# Patient Record
Sex: MALE | Race: WHITE | NOT HISPANIC OR LATINO | URBAN - METROPOLITAN AREA
[De-identification: names, ages, dates, MRNs, and addresses within clinical notes are randomized per-mention and may not be internally consistent; named-entity substitution may affect disease eponyms.]

---

## 2018-03-12 PROBLEM — 429993008 HISTORY OF CEREBROVASCULAR ACCIDENT WITHOUT RESIDUAL DEFICITS: Status: ACTIVE | Noted: 2018-03-12

## 2018-03-12 PROBLEM — 70153002 HEMORRHOIDS: Status: ACTIVE | Noted: 2018-03-12

## 2018-03-12 PROBLEM — 84089009 HIATAL HERNIA: Status: ACTIVE | Noted: 2018-03-12

## 2018-03-12 PROBLEM — 13644009 HYPERCHOLESTEROLEMIA: Status: ACTIVE | Noted: 2018-03-12

## 2018-03-12 PROBLEM — 398050005 DIVERTICULAR DISEASE OF COLON: Status: ACTIVE | Noted: 2018-03-12

## 2018-03-12 PROBLEM — 73430006 SLEEP APNEA: Status: ACTIVE | Noted: 2018-03-12

## 2021-08-28 ENCOUNTER — TELEPHONE ENCOUNTER (OUTPATIENT)
Dept: URBAN - METROPOLITAN AREA CLINIC 13 | Facility: CLINIC | Age: 56
End: 2021-08-28

## 2021-08-29 ENCOUNTER — TELEPHONE ENCOUNTER (OUTPATIENT)
Dept: URBAN - METROPOLITAN AREA CLINIC 13 | Facility: CLINIC | Age: 56
End: 2021-08-29

## 2021-08-29 RX ORDER — ASPIRIN 81 MG/1
TABLET, COATED ORAL
Status: ACTIVE | COMMUNITY

## 2021-08-29 RX ORDER — ROSUVASTATIN CALCIUM 10 MG/1
TABLET, FILM COATED ORAL
Status: ACTIVE | COMMUNITY

## 2021-08-29 RX ORDER — CLOPIDOGREL 75 MG/1
TABLET ORAL
Status: ACTIVE | COMMUNITY

## 2021-08-29 RX ORDER — PANTOPRAZOLE SODIUM 40 MG/1
TABLET, DELAYED RELEASE ORAL
Status: ACTIVE | COMMUNITY

## 2021-08-29 RX ORDER — DEXLANSOPRAZOLE 60 MG/1
CAPSULE, DELAYED RELEASE ORAL
Status: ACTIVE | COMMUNITY
Start: 2018-03-21

## 2021-08-29 RX ORDER — FAMOTIDINE 20 MG/1
TABLET ORAL
Status: ACTIVE | COMMUNITY

## 2024-04-11 ENCOUNTER — OV NP (OUTPATIENT)
Dept: URBAN - METROPOLITAN AREA CLINIC 48 | Facility: CLINIC | Age: 59
End: 2024-04-11

## 2024-04-11 RX ORDER — EMPAGLIFLOZIN AND LINAGLIPTIN 25; 5 MG/1; MG/1
TAKE 1 TABLET BY MOUTH IN THE MORNING TABLET, FILM COATED ORAL
Qty: 90 EACH | Refills: 0 | Status: ACTIVE | COMMUNITY

## 2024-04-11 RX ORDER — APIXABAN 5 MG/1
TAKE 1 TABLET BY MOUTH TWICE DAILY TO PREVENT BLOOD CLOTS IN CHRONIC ATRIAL FIBRILLATION INDICATIONS TABLET, FILM COATED ORAL
Qty: 180 EACH | Refills: 1 | Status: ACTIVE | COMMUNITY

## 2024-04-11 RX ORDER — METFORMIN HYDROCHLORIDE 1000 MG/1
1 TABLET WITH A MEAL TABLET, FILM COATED ORAL ONCE A DAY
Status: ACTIVE | COMMUNITY

## 2024-04-11 RX ORDER — OMEPRAZOLE 40 MG/1
CAPSULE, DELAYED RELEASE ORAL
Qty: 90 CAPSULE | Status: ACTIVE | COMMUNITY

## 2024-04-11 RX ORDER — SEMAGLUTIDE 0.68 MG/ML
INJECTION, SOLUTION SUBCUTANEOUS
Qty: 3 MILLILITER | Status: ACTIVE | COMMUNITY

## 2024-04-11 RX ORDER — ROSUVASTATIN CALCIUM 10 MG/1
1 TABLET TABLET, FILM COATED ORAL ONCE A DAY
Status: ACTIVE | COMMUNITY

## 2024-07-26 ENCOUNTER — DASHBOARD ENCOUNTERS (OUTPATIENT)
Age: 59
End: 2024-07-26

## 2024-07-29 ENCOUNTER — LAB OUTSIDE AN ENCOUNTER (OUTPATIENT)
Dept: URBAN - METROPOLITAN AREA CLINIC 48 | Facility: CLINIC | Age: 59
End: 2024-07-29

## 2024-07-29 ENCOUNTER — OFFICE VISIT (OUTPATIENT)
Dept: URBAN - METROPOLITAN AREA CLINIC 48 | Facility: CLINIC | Age: 59
End: 2024-07-29
Payer: COMMERCIAL

## 2024-07-29 VITALS
HEART RATE: 91 BPM | DIASTOLIC BLOOD PRESSURE: 77 MMHG | BODY MASS INDEX: 28.66 KG/M2 | HEIGHT: 72 IN | OXYGEN SATURATION: 98 % | TEMPERATURE: 98.1 F | SYSTOLIC BLOOD PRESSURE: 130 MMHG | WEIGHT: 211.6 LBS

## 2024-07-29 DIAGNOSIS — Z83.719 FAMILY HX COLONIC POLYPS: ICD-10-CM

## 2024-07-29 DIAGNOSIS — K21.9 GASTROESOPHAGEAL REFLUX DISEASE WITHOUT ESOPHAGITIS: ICD-10-CM

## 2024-07-29 DIAGNOSIS — C81.92 HODGKIN LYMPHOMA OF INTRATHORACIC LYMPH NODES, UNSPECIFIED HODGKIN LYMPHOMA TYPE: ICD-10-CM

## 2024-07-29 PROBLEM — 429969003: Status: ACTIVE | Noted: 2024-07-29

## 2024-07-29 PROBLEM — 266435005: Status: ACTIVE | Noted: 2024-07-29

## 2024-07-29 PROBLEM — 93522004: Status: ACTIVE | Noted: 2024-07-29

## 2024-07-29 PROCEDURE — 99204 OFFICE O/P NEW MOD 45 MIN: CPT | Performed by: NURSE PRACTITIONER

## 2024-07-29 RX ORDER — ROSUVASTATIN CALCIUM 10 MG/1
1 TABLET TABLET, FILM COATED ORAL ONCE A DAY
Status: ACTIVE | COMMUNITY

## 2024-07-29 RX ORDER — OMEPRAZOLE 40 MG/1
1 CAPSULE 30 MINUTES BEFORE MEAL CAPSULE, DELAYED RELEASE ORAL ONCE A DAY
Status: ACTIVE | COMMUNITY

## 2024-07-29 RX ORDER — APIXABAN 5 MG/1
AS DIRECTED TABLET, FILM COATED ORAL
Refills: 1 | Status: ACTIVE | COMMUNITY

## 2024-07-29 RX ORDER — METFORMIN HYDROCHLORIDE 1000 MG/1
1 TABLET WITH A MEAL TABLET, FILM COATED ORAL ONCE A DAY
Status: ACTIVE | COMMUNITY

## 2024-07-29 RX ORDER — SEMAGLUTIDE 0.68 MG/ML
INJECTION, SOLUTION SUBCUTANEOUS
Qty: 3 MILLILITER | Status: ACTIVE | COMMUNITY

## 2024-07-29 RX ORDER — ASPIRIN 81 MG/1
1 TABLET TABLET, COATED ORAL ONCE A DAY
Status: ACTIVE | COMMUNITY

## 2024-07-29 NOTE — HPI-TODAY'S VISIT:
59 year old male with a history of Hodgkin's lymphoma, presents for a colon consult. Bowel movements are daily of formed stool. The patient denies blood in stool, weight changes, or abdominal pain. Last colonoscopy was in 2016 and showed hypeplastic polyps, diverticulosis, and internal hemorrhoids. His mother had colon polyps.   The patient feels GERD is mostly controlled on Omeprazole 40mg daily and supplemental Famotidine. If he skips a dose, he gets GERD. Dexilant has worked the best but insurance does not cover it. EGD in 2018 was normal. Pasquale he is on Ozempic injections 2 months ago for diabetes.  In 2008, he had a uvulectomy and tonsilectmy. He has overproduction of of saliva and he chokes and has had nocturnal episodes. It has occurred for 2 years. At times, he feels that he aspirates on some foods such as rice.  He had stage 3 Hodgkins lymphoma and last treatment was in 2021. History of CVA in 2015 and is on daily Eliquis.

## 2024-10-01 ENCOUNTER — CLAIMS CREATED FROM THE CLAIM WINDOW (OUTPATIENT)
Dept: URBAN - METROPOLITAN AREA SURGERY CENTER 28 | Facility: SURGERY CENTER | Age: 59
End: 2024-10-01
Payer: COMMERCIAL

## 2024-10-01 ENCOUNTER — CLAIMS CREATED FROM THE CLAIM WINDOW (OUTPATIENT)
Dept: URBAN - METROPOLITAN AREA CLINIC 4 | Facility: CLINIC | Age: 59
End: 2024-10-01
Payer: COMMERCIAL

## 2024-10-01 DIAGNOSIS — Z12.11 COLON CANCER SCREENING: ICD-10-CM

## 2024-10-01 DIAGNOSIS — D12.5 ADENOMATOUS POLYP OF SIGMOID COLON: ICD-10-CM

## 2024-10-01 DIAGNOSIS — K63.5 BENIGN COLON POLYP: ICD-10-CM

## 2024-10-01 DIAGNOSIS — D12.4 ADENOMA OF DESCENDING COLON: ICD-10-CM

## 2024-10-01 DIAGNOSIS — D12.5 BENIGN NEOPLASM OF SIGMOID COLON: ICD-10-CM

## 2024-10-01 DIAGNOSIS — K22.2 ACQUIRED ESOPHAGEAL RING: ICD-10-CM

## 2024-10-01 DIAGNOSIS — K31.89 ACHYLIA: ICD-10-CM

## 2024-10-01 DIAGNOSIS — D12.5 ADENOMA OF SIGMOID COLON: ICD-10-CM

## 2024-10-01 DIAGNOSIS — K31.89 FOCAL FOVEOLAR HYPERPLASIA: ICD-10-CM

## 2024-10-01 DIAGNOSIS — K31.89 OTHER DISEASES OF STOMACH AND DUODENUM: ICD-10-CM

## 2024-10-01 DIAGNOSIS — K21.9 ACID REFLUX: ICD-10-CM

## 2024-10-01 DIAGNOSIS — D12.4 BENIGN NEOPLASM OF DESCENDING COLON: ICD-10-CM

## 2024-10-01 DIAGNOSIS — D12.4 ADENOMATOUS POLYP OF DESCENDING COLON: ICD-10-CM

## 2024-10-01 DIAGNOSIS — K63.5 BENIGN COLON POLYPS: ICD-10-CM

## 2024-10-01 DIAGNOSIS — K63.5 POLYP OF COLON: ICD-10-CM

## 2024-10-01 DIAGNOSIS — R13.19 CERVICAL DYSPHAGIA: ICD-10-CM

## 2024-10-01 PROCEDURE — 45380 COLONOSCOPY AND BIOPSY: CPT | Performed by: INTERNAL MEDICINE

## 2024-10-01 PROCEDURE — 88305 TISSUE EXAM BY PATHOLOGIST: CPT | Performed by: PATHOLOGY

## 2024-10-01 PROCEDURE — 43239 EGD BIOPSY SINGLE/MULTIPLE: CPT | Performed by: INTERNAL MEDICINE

## 2024-10-01 PROCEDURE — 45385 COLONOSCOPY W/LESION REMOVAL: CPT | Performed by: INTERNAL MEDICINE

## 2024-10-01 PROCEDURE — 88312 SPECIAL STAINS GROUP 1: CPT | Performed by: PATHOLOGY

## 2024-10-01 PROCEDURE — 43248 EGD GUIDE WIRE INSERTION: CPT | Performed by: INTERNAL MEDICINE

## 2024-10-01 PROCEDURE — 00813 ANES UPR LWR GI NDSC PX: CPT | Performed by: ANESTHESIOLOGY

## 2024-10-01 RX ORDER — ASPIRIN 81 MG/1
1 TABLET TABLET, COATED ORAL ONCE A DAY
Status: ACTIVE | COMMUNITY

## 2024-10-01 RX ORDER — METFORMIN HYDROCHLORIDE 1000 MG/1
1 TABLET WITH A MEAL TABLET, FILM COATED ORAL ONCE A DAY
Status: ACTIVE | COMMUNITY

## 2024-10-01 RX ORDER — APIXABAN 5 MG/1
AS DIRECTED TABLET, FILM COATED ORAL
Refills: 1 | Status: ACTIVE | COMMUNITY

## 2024-10-01 RX ORDER — ROSUVASTATIN CALCIUM 10 MG/1
1 TABLET TABLET, FILM COATED ORAL ONCE A DAY
Status: ACTIVE | COMMUNITY

## 2024-10-01 RX ORDER — SEMAGLUTIDE 0.68 MG/ML
INJECTION, SOLUTION SUBCUTANEOUS
Qty: 3 MILLILITER | Status: ACTIVE | COMMUNITY

## 2024-10-01 RX ORDER — OMEPRAZOLE 40 MG/1
1 CAPSULE 30 MINUTES BEFORE MEAL CAPSULE, DELAYED RELEASE ORAL ONCE A DAY
Status: ACTIVE | COMMUNITY

## 2024-12-04 ENCOUNTER — OFFICE VISIT (OUTPATIENT)
Dept: URBAN - METROPOLITAN AREA CLINIC 48 | Facility: CLINIC | Age: 59
End: 2024-12-04
Payer: COMMERCIAL

## 2024-12-04 VITALS
WEIGHT: 216.4 LBS | BODY MASS INDEX: 29.31 KG/M2 | DIASTOLIC BLOOD PRESSURE: 82 MMHG | SYSTOLIC BLOOD PRESSURE: 123 MMHG | TEMPERATURE: 97 F | HEIGHT: 72 IN | HEART RATE: 86 BPM

## 2024-12-04 DIAGNOSIS — C81.92 HODGKIN LYMPHOMA OF INTRATHORACIC LYMPH NODES, UNSPECIFIED HODGKIN LYMPHOMA TYPE: ICD-10-CM

## 2024-12-04 DIAGNOSIS — Z83.719 FAMILY HX COLONIC POLYPS: ICD-10-CM

## 2024-12-04 DIAGNOSIS — R10.31 RLQ ABDOMINAL PAIN: ICD-10-CM

## 2024-12-04 DIAGNOSIS — K21.9 GASTROESOPHAGEAL REFLUX DISEASE WITHOUT ESOPHAGITIS: ICD-10-CM

## 2024-12-04 PROBLEM — 301754002: Status: ACTIVE | Noted: 2024-12-04

## 2024-12-04 PROCEDURE — 99213 OFFICE O/P EST LOW 20 MIN: CPT | Performed by: INTERNAL MEDICINE

## 2024-12-04 RX ORDER — ASPIRIN 81 MG/1
1 TABLET TABLET, COATED ORAL ONCE A DAY
Status: ACTIVE | COMMUNITY

## 2024-12-04 RX ORDER — ROSUVASTATIN CALCIUM 10 MG/1
1 TABLET TABLET, FILM COATED ORAL ONCE A DAY
Status: ACTIVE | COMMUNITY

## 2024-12-04 RX ORDER — SEMAGLUTIDE 0.68 MG/ML
INJECTION, SOLUTION SUBCUTANEOUS
Qty: 3 MILLILITER | Status: ACTIVE | COMMUNITY

## 2024-12-04 RX ORDER — OMEPRAZOLE 40 MG/1
1 CAPSULE 30 MINUTES BEFORE MEAL CAPSULE, DELAYED RELEASE ORAL ONCE A DAY
Status: ACTIVE | COMMUNITY

## 2024-12-04 RX ORDER — APIXABAN 5 MG/1
AS DIRECTED TABLET, FILM COATED ORAL
Refills: 1 | Status: ACTIVE | COMMUNITY

## 2024-12-04 RX ORDER — METFORMIN HYDROCHLORIDE 1000 MG/1
1 TABLET WITH A MEAL TABLET, FILM COATED ORAL ONCE A DAY
Status: ACTIVE | COMMUNITY

## 2024-12-04 NOTE — HPI-TODAY'S VISIT:
59 year old male with a history of Hodgkin's lymphoma, presents for follow up after EGD/colon. 10/2024 EGD/colon showed gastritis, esophageal stenosis status post 57 Fr dilatation, adenomatous, internal hemorrhoids, and diverticulosis. A repeat colonoscopy was recommended for 1 year. He reports improvement of phlegm production and dysphagia since EGD. The patient had a uvulectomy and tonsilectmy and feels it contributes to symptoms. Occasionally, he has an issue with rice or cornbread. GERD is mostly controlled on daily Omepraozle. At times, he has an increase of it at night.  If he skips a dose, he gets GERD. Dexilant has worked the best but insurance does not cover it.  Currently, he is on Ozempic for diabetes.  He had stage 3 Hodgkins lymphoma and last treatment was in 2021. History of CVA in 2015 and is on daily Eliquis.

## 2024-12-12 ENCOUNTER — TELEPHONE ENCOUNTER (OUTPATIENT)
Dept: URBAN - METROPOLITAN AREA CLINIC 46 | Facility: CLINIC | Age: 59
End: 2024-12-12

## 2024-12-12 RX ORDER — VONOPRAZAN FUMARATE 26.72 MG/1
1 TABLET TABLET ORAL ONCE A DAY
Qty: 30 | Refills: 5 | OUTPATIENT
Start: 2024-12-12 | End: 2025-06-10

## 2025-02-04 ENCOUNTER — OFFICE VISIT (OUTPATIENT)
Dept: URBAN - METROPOLITAN AREA CLINIC 48 | Facility: CLINIC | Age: 60
End: 2025-02-04

## 2025-02-13 ENCOUNTER — OFFICE VISIT (OUTPATIENT)
Dept: URBAN - METROPOLITAN AREA TELEHEALTH 2 | Facility: TELEHEALTH | Age: 60
End: 2025-02-13
Payer: COMMERCIAL

## 2025-02-13 VITALS — BODY MASS INDEX: 29.8 KG/M2 | HEIGHT: 72 IN | WEIGHT: 220 LBS

## 2025-02-13 DIAGNOSIS — C81.92 HODGKIN LYMPHOMA OF INTRATHORACIC LYMPH NODES, UNSPECIFIED HODGKIN LYMPHOMA TYPE: ICD-10-CM

## 2025-02-13 DIAGNOSIS — K76.0 HEPATIC STEATOSIS: ICD-10-CM

## 2025-02-13 DIAGNOSIS — Z83.719 FAMILY HX COLONIC POLYPS: ICD-10-CM

## 2025-02-13 DIAGNOSIS — R10.31 RLQ ABDOMINAL PAIN: ICD-10-CM

## 2025-02-13 DIAGNOSIS — R13.13 PHARYNGEAL DYSPHAGIA: ICD-10-CM

## 2025-02-13 DIAGNOSIS — K21.9 GASTROESOPHAGEAL REFLUX DISEASE WITHOUT ESOPHAGITIS: ICD-10-CM

## 2025-02-13 PROBLEM — 21101000119105: Status: ACTIVE | Noted: 2025-02-13

## 2025-02-13 PROBLEM — 197321007: Status: ACTIVE | Noted: 2025-02-13

## 2025-02-13 PROCEDURE — 99213 OFFICE O/P EST LOW 20 MIN: CPT | Performed by: INTERNAL MEDICINE

## 2025-02-13 RX ORDER — ROSUVASTATIN CALCIUM 10 MG/1
1 TABLET TABLET, FILM COATED ORAL ONCE A DAY
Status: ACTIVE | COMMUNITY

## 2025-02-13 RX ORDER — ASPIRIN 81 MG/1
1 TABLET TABLET, COATED ORAL ONCE A DAY
Status: ACTIVE | COMMUNITY

## 2025-02-13 RX ORDER — OMEPRAZOLE 40 MG/1
1 CAPSULE 30 MINUTES BEFORE MEAL CAPSULE, DELAYED RELEASE ORAL ONCE A DAY
Status: ACTIVE | COMMUNITY

## 2025-02-13 RX ORDER — VONOPRAZAN FUMARATE 26.72 MG/1
1 TABLET TABLET ORAL ONCE A DAY
Qty: 30 | Refills: 5 | Status: ACTIVE | COMMUNITY
Start: 2024-12-12 | End: 2025-06-10

## 2025-02-13 RX ORDER — ESOMEPRAZOLE MAGNESIUM 40 MG/1
1 CAPSULE 1/2 TO 1 HOUR BEFORE MORNING MEAL CAPSULE, DELAYED RELEASE PELLETS ORAL ONCE A DAY
Qty: 30 | Refills: 5 | OUTPATIENT
Start: 2025-02-13

## 2025-02-13 RX ORDER — METFORMIN HYDROCHLORIDE 1000 MG/1
1 TABLET WITH A MEAL TABLET, FILM COATED ORAL ONCE A DAY
Status: ACTIVE | COMMUNITY

## 2025-02-13 RX ORDER — APIXABAN 5 MG/1
AS DIRECTED TABLET, FILM COATED ORAL
Refills: 1 | Status: ACTIVE | COMMUNITY

## 2025-02-13 RX ORDER — LORATADINE 10 MG
1 TABLET TABLET ORAL ONCE A DAY
Status: ACTIVE | COMMUNITY

## 2025-02-13 RX ORDER — SEMAGLUTIDE 0.68 MG/ML
INJECTION, SOLUTION SUBCUTANEOUS
Qty: 3 MILLILITER | Status: ACTIVE | COMMUNITY

## 2025-02-13 RX ORDER — SCOPOLAMINE 1 MG/3D
1 PATCH TO SKIN BEHIND THE EAR AS NEEDED PATCH TRANSDERMAL
Status: ACTIVE | COMMUNITY

## 2025-02-13 RX ORDER — FAMOTIDINE 40 MG/1
1 TABLET AS NEEDED FOR BREAKTHROUGH REFLUX TABLET, FILM COATED ORAL ONCE A DAY
Qty: 30 | Refills: 3 | OUTPATIENT
Start: 2025-02-13

## 2025-02-13 NOTE — HPI-TODAY'S VISIT:
59 year old male with a history of Hodgkin's lymphoma, presents for follow up. The patient has a history of dysphagia and reflux. GERD was not well controlled on Omeprazole 40mg and he was having nocturnal symptoms. Voquezna 20mg was ordered. It was started but caused diarrhea. He stopped it and restarted Omeprazole 40mg. Since then, he continues to have some breakthrough episodes. 10/2024 EGD/colon showed gastritis, esophageal stenosis status post 57 Fr dilatation, adenomatous, internal hemorrhoids, and diverticulosis. A repeat colonoscopy was recommended for 1 year.  He reports improvement of phlegm production and dysphagia since EGD. The patient had a uvulectomy and tonsilectmy and feels it contributes to symptoms. Occasionally, he has an issue with rice or cornbread. Currently, he is on Ozempic for diabetes. Additionally, he has had RLQ pain intermittently and worse with movement. Bowel movements are daily with complete evacuation.  His oncologist ordered a CT1/2025 that showed hepatic steatosis without hepatic lesion, mildly enlarged spleen, and mild diverticulosis, otherwise no GI pathology.  History of CVA in 2015 and is on daily Eliquis. The patient had stage 3 Hodgkins lymphoma and last treatment was in 2021. His sister has hepatic steatosis.

## 2025-06-16 ENCOUNTER — OFFICE VISIT (OUTPATIENT)
Dept: URBAN - METROPOLITAN AREA CLINIC 48 | Facility: CLINIC | Age: 60
End: 2025-06-16

## 2025-06-16 ENCOUNTER — OFFICE VISIT (OUTPATIENT)
Dept: URBAN - METROPOLITAN AREA TELEHEALTH 2 | Facility: TELEHEALTH | Age: 60
End: 2025-06-16
Payer: COMMERCIAL

## 2025-06-16 ENCOUNTER — LAB OUTSIDE AN ENCOUNTER (OUTPATIENT)
Dept: URBAN - METROPOLITAN AREA TELEHEALTH 2 | Facility: TELEHEALTH | Age: 60
End: 2025-06-16

## 2025-06-16 DIAGNOSIS — K21.9 GASTROESOPHAGEAL REFLUX DISEASE WITHOUT ESOPHAGITIS: ICD-10-CM

## 2025-06-16 DIAGNOSIS — R13.13 PHARYNGEAL DYSPHAGIA: ICD-10-CM

## 2025-06-16 DIAGNOSIS — C81.92 HODGKIN LYMPHOMA OF INTRATHORACIC LYMPH NODES, UNSPECIFIED HODGKIN LYMPHOMA TYPE: ICD-10-CM

## 2025-06-16 DIAGNOSIS — Z83.719 FAMILY HX COLONIC POLYPS: ICD-10-CM

## 2025-06-16 DIAGNOSIS — R10.31 RLQ ABDOMINAL PAIN: ICD-10-CM

## 2025-06-16 DIAGNOSIS — K76.0 HEPATIC STEATOSIS: ICD-10-CM

## 2025-06-16 PROCEDURE — 99213 OFFICE O/P EST LOW 20 MIN: CPT | Performed by: INTERNAL MEDICINE

## 2025-06-16 RX ORDER — LORATADINE 10 MG
1 TABLET TABLET ORAL ONCE A DAY
Status: ACTIVE | COMMUNITY

## 2025-06-16 RX ORDER — ROSUVASTATIN CALCIUM 10 MG/1
1 TABLET TABLET, FILM COATED ORAL ONCE A DAY
Status: ACTIVE | COMMUNITY

## 2025-06-16 RX ORDER — SEMAGLUTIDE 0.68 MG/ML
INJECTION, SOLUTION SUBCUTANEOUS
Qty: 3 MILLILITER | Status: ACTIVE | COMMUNITY

## 2025-06-16 RX ORDER — APIXABAN 5 MG/1
AS DIRECTED TABLET, FILM COATED ORAL
Refills: 1 | Status: ACTIVE | COMMUNITY

## 2025-06-16 RX ORDER — FAMOTIDINE 40 MG/1
1 TABLET AS NEEDED FOR BREAKTHROUGH REFLUX TABLET, FILM COATED ORAL ONCE A DAY
Qty: 30 | Refills: 3 | Status: ACTIVE | COMMUNITY
Start: 2025-02-13

## 2025-06-16 RX ORDER — ASPIRIN 81 MG/1
1 TABLET TABLET, COATED ORAL ONCE A DAY
Status: ACTIVE | COMMUNITY

## 2025-06-16 RX ORDER — OMEPRAZOLE 40 MG/1
1 CAPSULE 30 MINUTES BEFORE MEAL CAPSULE, DELAYED RELEASE ORAL ONCE A DAY
Status: ACTIVE | COMMUNITY

## 2025-06-16 RX ORDER — METFORMIN HYDROCHLORIDE 1000 MG/1
1 TABLET WITH A MEAL TABLET, FILM COATED ORAL ONCE A DAY
Status: ACTIVE | COMMUNITY

## 2025-06-16 RX ORDER — ESOMEPRAZOLE MAGNESIUM 40 MG/1
1 CAPSULE 1/2 TO 1 HOUR BEFORE MORNING MEAL CAPSULE, DELAYED RELEASE PELLETS ORAL ONCE A DAY
Qty: 30 | Refills: 11 | OUTPATIENT
Start: 2025-06-16

## 2025-06-16 RX ORDER — SCOPOLAMINE 1 MG/3D
1 PATCH TO SKIN BEHIND THE EAR AS NEEDED PATCH TRANSDERMAL
Status: ACTIVE | COMMUNITY

## 2025-06-16 NOTE — HPI-TODAY'S VISIT:
59 year old male with a history of Hodgkin's lymphoma, presents for follow up. The patient has a history of dysphagia and reflux. GERD had not been well controlled on Omeprazole 40mg. He tried Voquezna 20mg but it caused diarrhea. Esomeprazole was started and GERD is under better control now. Recently he reported a day of sulfa burps, but baking soda resolved the symptom. He has had some mild epigastric queasiness and started probiotics which seemed to help. Of note, he has been on Ozempic for diabetes. The pateint has a history of dysphagia. 10/2024 EGD/colon showed gastritis, esophageal stenosis status post 57 Fr dilatation, adenomatous, internal hemorrhoids, and diverticulosis. A repeat colonoscopy was recommended for 1 year.  He reports improvement of phlegm production and dysphagia since EGD. The patient had a uvulectomy and tonsilectmy and feels it contributes to symptoms. Occasionally, he has an issue with rice or cornbread.  Bowel movements are daily with complete evacuation.  His oncologist ordered a CT1/2025 that showed hepatic steatosis without hepatic lesion, mildly enlarged spleen, and mild diverticulosis, otherwise no GI pathology. LFTs have been normal for years but at his last visit in 5/2025 AST 41 and ALT 52.   History of CVA in 2015 and is on daily Eliquis.  The patient had stage 3 Hodgkins lymphoma and last treatment was in 2021. His sister has hepatic steatosis.

## 2025-07-25 ENCOUNTER — OFFICE VISIT (OUTPATIENT)
Dept: URBAN - METROPOLITAN AREA TELEHEALTH 2 | Facility: TELEHEALTH | Age: 60
End: 2025-07-25